# Patient Record
Sex: FEMALE | Race: WHITE
[De-identification: names, ages, dates, MRNs, and addresses within clinical notes are randomized per-mention and may not be internally consistent; named-entity substitution may affect disease eponyms.]

---

## 2018-07-02 ENCOUNTER — HOSPITAL ENCOUNTER (OUTPATIENT)
Dept: HOSPITAL 63 - US | Age: 23
Discharge: HOME | End: 2018-07-02
Attending: PHYSICIAN ASSISTANT
Payer: COMMERCIAL

## 2018-07-02 DIAGNOSIS — N93.8: Primary | ICD-10-CM

## 2018-07-02 PROCEDURE — 76805 OB US >/= 14 WKS SNGL FETUS: CPT

## 2018-07-02 PROCEDURE — 76817 TRANSVAGINAL US OBSTETRIC: CPT

## 2018-07-02 NOTE — RAD
Obstetric pelvic ultrasound July 2, 2018

 

INDICATION: Vaginal bleeding.

 

COMPARISON: None available.

 

TECHNIQUE: Transabdominal and transvaginal imaging was performed. 

Sonographic evaluation of the pelvis was performed utilizing grayscale, 

color Doppler and spectral waveform analysis.

 

FINDINGS:

 

The uterus measures 7.4 x 4.2 x 3.4 cm. Endometrium measures 9 mm. No 

uterine masses are visualized. Gestational sac, yolk sac and embryo are 

not visualized. No measurable fetal heart tones.

 

There is no free fluid in the pelvis.

 

The right ovary measures 3.1 x 2.4 x 2.6 cm. Suspected corpus luteum 

within the right ovary measuring 1.0 x 0.9 cm. Arterial and venous 

waveform identified at the time of imaging.

 

The left ovary measures 2.8 x 2.1 x 1.7 cm. Follicular changes are 

present. Arterial and venous waveform are identified at the time of 

imaging.

 

Visualized portions of the urinary bladder appear normal.

 

IMPRESSION:

 

Intrauterine gestation is not visualized. Findings may represent early 

pregnancy versus pregnancy failure versus ectopic pregnancy. Recommend 

short-term follow-up serial beta hCG and pelvic ultrasound for 

confirmation of viable intrauterine pregnancy.

 

Electronically signed by: Kenisha Pineda MD (7/2/2018 10:04 AM) 

[facility]

## 2019-10-07 ENCOUNTER — HOSPITAL ENCOUNTER (OUTPATIENT)
Dept: HOSPITAL 63 - US | Age: 24
Discharge: HOME | End: 2019-10-07
Attending: FAMILY MEDICINE
Payer: COMMERCIAL

## 2019-10-07 DIAGNOSIS — N93.9: ICD-10-CM

## 2019-10-07 DIAGNOSIS — N83.02: Primary | ICD-10-CM

## 2019-10-07 PROCEDURE — 76856 US EXAM PELVIC COMPLETE: CPT

## 2019-10-07 PROCEDURE — 76830 TRANSVAGINAL US NON-OB: CPT

## 2019-10-07 NOTE — RAD
EXAM: Pelvic sonogram.

 

HISTORY: Vaginal bleeding.

 

TECHNIQUE: Transabdominal and transvaginal sonographic imaging of the 

pelvis was performed.

 

COMPARISON: None.

 

FINDINGS: The uterus measures 7.4 x 4.4 x 3.3 cm. The endometrial stripe 

measures 4 mm in thickness. The ovaries are normal in size and demonstrate

normal blood flow. There are multiple ovarian follicles with a dominant 

left ovarian follicle/follicular cyst measuring 1.8 cm. There is no pelvic

free fluid.

 

IMPRESSION:

1. Physiologic dominant left ovarian follicle/follicular cyst measuring 

1.8 cm.

2. Otherwise, unremarkable pelvic sonogram.

 

Electronically signed by: Ericka Brizuela MD (10/7/2019 3:21 PM) [facility]